# Patient Record
Sex: MALE | Race: WHITE | NOT HISPANIC OR LATINO | Employment: OTHER | URBAN - METROPOLITAN AREA
[De-identification: names, ages, dates, MRNs, and addresses within clinical notes are randomized per-mention and may not be internally consistent; named-entity substitution may affect disease eponyms.]

---

## 2017-01-23 ENCOUNTER — HOSPITAL ENCOUNTER (EMERGENCY)
Facility: HOSPITAL | Age: 72
Discharge: HOME | End: 2017-01-23
Payer: MEDICARE

## 2017-01-23 VITALS
HEIGHT: 72 IN | SYSTOLIC BLOOD PRESSURE: 199 MMHG | TEMPERATURE: 98 F | DIASTOLIC BLOOD PRESSURE: 84 MMHG | OXYGEN SATURATION: 95 % | BODY MASS INDEX: 28.17 KG/M2 | RESPIRATION RATE: 18 BRPM | WEIGHT: 208 LBS | HEART RATE: 72 BPM

## 2017-01-23 DIAGNOSIS — I10 ESSENTIAL HYPERTENSION: ICD-10-CM

## 2017-01-23 DIAGNOSIS — S09.90XA HEAD INJURY, INITIAL ENCOUNTER: ICD-10-CM

## 2017-01-23 DIAGNOSIS — S00.03XA SCALP HEMATOMA, INITIAL ENCOUNTER: ICD-10-CM

## 2017-01-23 DIAGNOSIS — S52.502A CLOSED FRACTURE OF DISTAL END OF LEFT RADIUS, UNSPECIFIED FRACTURE MORPHOLOGY, INITIAL ENCOUNTER: Primary | ICD-10-CM

## 2017-01-23 DIAGNOSIS — M25.432 WRIST SWELLING, LEFT: ICD-10-CM

## 2017-01-23 DIAGNOSIS — M79.602 LEFT ARM PAIN: ICD-10-CM

## 2017-01-23 PROCEDURE — 99284 EMERGENCY DEPT VISIT MOD MDM: CPT | Mod: 25

## 2017-01-23 PROCEDURE — 29125 APPL SHORT ARM SPLINT STATIC: CPT | Mod: LT

## 2017-01-23 PROCEDURE — 25000003 PHARM REV CODE 250: Performed by: PHYSICIAN ASSISTANT

## 2017-01-23 RX ADMIN — BACITRACIN, NEOMYCIN, POLYMYXIN B 1 EACH: 400; 3.5; 5 OINTMENT TOPICAL at 07:01

## 2017-01-23 NOTE — ED PROVIDER NOTES
Encounter Date: 1/23/2017       History     Chief Complaint   Patient presents with    Fall     tripped on foot and hit head at dialysis. fell saturday as well. pt not on blood thinners     Arm Injury     left. from fall saturday     Review of patient's allergies indicates:  No Known Allergies  HPI Comments: Pt reports to ED c/o arm injury after a fall.  Pt reports falling Saturday and hitting his left arm. Pt also fell today at dialysis after tripping on his shoe and hit the back of his head.  Incident occurred PTA. Pt denies any LOC, but wife reports two episodes of emesis en route to ED. Wife said the vomiting may be d/t the fact that he has not eaten all day.  Pain to left arm is mostly near left wrist with increased pain on palpation. Pt denies any current nausea, LOC, CP, SOB, dizziness, blurred vision, headache, or any other associated sxs.  Pt is currently off all blood thinners d/t hx of bleeding at fistula site, which is to left arm.  Pt reports pain is tolerable and prefers taking motrin for pain.     The history is provided by the patient.     Past Medical History   Diagnosis Date    Diabetes mellitus     Hypertension     Kidney failure     Prostate cancer      No past medical history pertinent negatives.  Past Surgical History   Procedure Laterality Date    Av fistula placement      Prostatectomy       History reviewed. No pertinent family history.  Social History   Substance Use Topics    Smoking status: Former Smoker    Smokeless tobacco: None    Alcohol use No     Review of Systems   Constitutional: Negative for fever.   HENT: Negative for sore throat.    Respiratory: Negative for shortness of breath.    Cardiovascular: Negative for chest pain.   Gastrointestinal: Negative for nausea.   Genitourinary: Negative for dysuria.   Musculoskeletal: Positive for arthralgias (left wrist pain with swelling). Negative for back pain and gait problem.   Skin: Positive for wound (laceration to back of  head). Negative for rash.   Neurological: Negative for weakness.   Hematological: Does not bruise/bleed easily.   All other systems reviewed and are negative.      Physical Exam   Initial Vitals   BP Pulse Resp Temp SpO2   17 1731 17 1731 17 1731 17 1731 17 1731   203/93 74 20 97.8 °F (36.6 °C) 95 %     Physical Exam    Nursing note and vitals reviewed.  Constitutional: He appears well-developed and well-nourished.   HENT:   Head: Normocephalic and atraumatic.   Right Ear: External ear normal.   Left Ear: External ear normal.   Mouth/Throat: Oropharynx is clear and moist.   Eyes: Conjunctivae and EOM are normal. Pupils are equal, round, and reactive to light.   Neck: Normal range of motion. Neck supple.   Cardiovascular: Normal rate and regular rhythm.   Pulmonary/Chest: Breath sounds normal. No respiratory distress. He has no wheezes.   Abdominal: Soft. Bowel sounds are normal. He exhibits no distension. There is no tenderness.   Musculoskeletal: Normal range of motion.   3/5 strength to left upper extremity with tenderness to palpation. Swelling to distal forearm and wrist.    Neurological: He is alert and oriented to person, place, and time. He has normal strength and normal reflexes.   Skin:   Superficial star burst laceration to posterior scalp with large hematoma measuring 3 cm in diameter. Not requiring sutures or staples.          ED Course   Orthopedic Injury  Date/Time: 2017 7:46 PM  Authorized by: FRANCINE JIMENEZ   Performed by: DINORAH JIMENEZ  Consent Done: Yes  Consent: Verbal consent obtained.  Risks and benefits: risks, benefits and alternatives were discussed  Consent given by: patient  Patient identity confirmed: , MRN and name  Injury location: wrist  Location details: left wrist  Injury type: fracture  Fracture type: distal radius  Pre-procedure distal perfusion: normal  Pre-procedure neurological function: normal  Pre-procedure neurovascular  assessment: neurovascularly intact  Pre-procedure range of motion: reduced  Local anesthesia used: no    Anesthesia:  Local anesthesia used: no  Sedation:  Patient sedated: no    Manipulation performed: no  Immobilization: splint  Splint type: sugar tong  Complications: No  Specimens: No  Implants: No  Post-procedure neurovascular assessment: post-procedure neurovascularly intact  Post-procedure distal perfusion: normal  Post-procedure neurological function: normal  Patient tolerance: Patient tolerated the procedure well with no immediate complications    Lac Repair  Date/Time: 1/23/2017 7:47 PM  Performed by: DINORAH JIMENEZ  Authorized by: FRANCINE JIMENEZ   Consent Done: Not Needed  Body area: head/neck  Location details: scalp  Foreign bodies: no foreign bodies  Tendon involvement: none  Nerve involvement: none  Patient sedated: no  Dressing: dressing applied, non-stick sterile dressing and petrolatum gauze  Patient tolerance: Patient tolerated the procedure well with no immediate complications        Labs Reviewed - No data to display                            ED Course     Clinical Impression:   The primary encounter diagnosis was Closed fracture of distal end of left radius, unspecified fracture morphology, initial encounter. Diagnoses of Wrist swelling, left, Left arm pain, Scalp hematoma, initial encounter, Head injury, initial encounter, and Essential hypertension were also pertinent to this visit.          SANDRA Herrera  01/23/17 1948

## 2017-01-23 NOTE — ED AVS SNAPSHOT
[facility]  [address] [address]               Jose Walton   2017  5:32 PM   ED    Description:  Male : 1945   Department:  [facility]           Your Care was Coordinated By:     Provider Role From To    SANDRA Herrera Physician Assistant [phone] --      Reason for Visit     Fall     Arm Injury           Diagnoses this Visit        Comments    Closed fracture of distal end of left radius, unspecified fracture morphology, initial encounter    -  Primary     Wrist swelling, left         Left arm pain         Scalp hematoma, initial encounter         Head injury, initial encounter         Essential hypertension           ED Disposition     None           To Do List           Follow-up Information     Follow up with Harrison Zarate MD In 2 days.    Specialty:  Orthopedic Surgery    Why:  for evaluation of fracture, If symptoms worsen return to ED     Contact information:    [id number] [place] [address]  [phone]        [facility]     [facility] Care [facility] -  Assistance  Registered nurses in the [facility] provide clinical advisement, health education, appointment booking, and other advisory services.  Call for this free service at [phone].             Medications           Message regarding Medications     Verify the changes and/or additions to your medication regime listed below are the same as discussed with your clinician today.  If any of these changes or additions are incorrect, please notify your healthcare provider.             Verify that the below list of medications is an accurate representation of the medications you are currently taking.  If none reported, the list may be blank. If incorrect, please contact your healthcare provider. Carry this list with you in case of emergency.                Clinical Reference Information           Your Vitals  Were     BP Pulse Temp Resp Height Weight    203/93 (BP Location: Right arm, Patient Position: Sitting) 74 97.8 °F (36.6 °C) (Oral) 20 6' (1.829 m) 94.3 kg (208 lb)    SpO2 BMI             95% 28.21 kg/m2         Allergies as of 1/23/2017     No Known Allergies      Immunizations Administered on Date of Encounter - 1/23/2017     None      ED Micro, Lab, POCT     None      ED Imaging Orders     Start Ordered       Status Ordering Provider    01/23/17 1800 01/23/17 1759  X-Ray Elbow Complete Left  1 time imaging      Final result     01/23/17 1800 01/23/17 1759  CT Head Without Contrast  1 time imaging      Final result     01/23/17 1759 01/23/17 1759  X-Ray Wrist Complete Left  1 time imaging      Final result         Discharge Instructions         Understanding a Distal Radius Fracture      A fracture is a broken bone. A fracture in the distal radius is a break in the lower end of the radius. This is the larger bone in the forearm. Because the break occurs near the wrist, it is often called a wrist fracture.    The bone may be cracked, or it may be broken into 2 or more pieces. The pieces of bone may be lined up or they may have moved out of place. Sometimes, the bone may break through the skin. Nearby nerves, tissues, and joints also may be damaged. Depending on the severity of the fracture, healing may take several months or longer.  What causes a distal radius fracture?  This type of fracture is most often caused from a fall on an outstretched hand. It can also be caused from a blow, accident, or sports injury.  Symptoms of a distal radius fracture  Symptoms can include pain, swelling, and bruising. If the bone breaks through the skin, external bleeding can also occur. The wrist may look crooked, deformed, or bent. It may be hard to move or use the arm, wrist, and hand for normal tasks and activities.  Treating a distal radius fracture  Treatment depends on how serious the fracture is. If needed, the bone is put  back into place. This may be done with or without surgery. If surgery is needed, the surgeon may use devices such as pins, plates, or screws to hold the bone together. You may need to wear a splint or cast for a month or longer to protect the bone and keep it in place during healing. Other treatments may be also used to help reduce symptoms or regain function. These include:  · Cold packs. Putting an ice pack on the injured area may help reduce swelling and pain.  · Raising the arm and wrist. Keeping the arm and wrist raised above heart level may help reduce swelling.  · Pain medicines. Taking prescription or over-the-counter pain medicines may help reduce pain and swelling.  · Exercises. Doing certain exercises at home or with a physical therapist can help restore strength, flexibility, and range of motion in your arm, wrist, and hand. In general, exercises are not started until after the splint or cast is removed.  Possible complications of a distal radius fracture  These can include:  · Poor healing of the bone  · Weakness, stiffness, or loss of range of motion in the arm, wrist, or hand  · Osteoarthritis in the wrist joint  When to call your healthcare provider  Call your healthcare provider right away if you have any of these:  · Fever of 100.4°F (38°C) or higher, or as directed  · Symptoms that dont get better with treatment, or get worse  · Numbness, coldness, or swelling in your arm, hand, or fingers  · Fingernails that turn blue or gray in color  · A splint or cast that is damaged or feels too tight or loose  · New symptoms   © 0915-5372 [employer]. [address] [place] [address], [place] [address]. All rights reserved. This information is not intended as a substitute for professional medical care. Always follow your healthcare professional's instructions.          [facility] Sign-Up     Activating your [facility] account is as easy as 1-2-3!     1) Visit [email or web address], select Sign Up Now, enter this  activation code and your date of birth, then select Next.  [email or web address]  Expires: 3/9/2017  7:44 PM      2) Create a username and password to use when you visit [facility] in the future and select a security question in case you lose your password and select Next.    3) Enter your e-mail address and click Sign Up!    Additional Information  If you have questions, please e-mail FAD ? [email or web address] or call [phone] to talk to our [facility] staff. Remember, [facility] is NOT to be used for urgent needs. For medical emergencies, dial 911.         Smoking Cessation     If you would like to quit smoking:   You may be eligible for free services if you are a [place] resident and started smoking cigarettes before September 1, 1988.  Call the Smoking Cessation Trust (SCT) toll free at (394) 111-8551 or (747) 395-5763.   Call 6-453-QUIT-NOW if you do not meet the above criteria.             [facility] complies with applicable Federal civil rights laws and does not discriminate on the basis of race, color, national origin, age, disability, or sex.        Language Assistance Services     ATTENTION: Language assistance services are available, free of charge. Please call [phone].      ATENCIÓN: Si habla español, tiene a ramirez disposición servicios gratuitos de asistencia lingüística. Llame al [phone].     CHÚ Ý: N?u b?n nói Ti?ng Vi?t, có các d?ch v? h? tr? ngôn ng? mi?n phí dành cho b?n. G?i s? [phone].

## 2017-01-24 NOTE — ED NOTES
Report rec'd from Tameka.     Pt AAOx3. Abrasion noted to posterior head. Bleeding controlled. Respirations even and unlabored. Pupils PERRLA. Left arm pain s/p fall. Swelling noted to left arm. Cap refill <3sec bilateral upper extremities, 2+ radial pulses to bilateral upper extremities. Pt sitting in wheelchair with spouse at bedside. Answered all questions.

## 2017-01-24 NOTE — ED NOTES
"Romain notified of pt discharge vital signs. OK for pt to be discharged at this time. Pt states " I have another blood pressure medicine to take once I get home".   "

## 2017-01-24 NOTE — DISCHARGE INSTRUCTIONS
Understanding a Distal Radius Fracture      A fracture is a broken bone. A fracture in the distal radius is a break in the lower end of the radius. This is the larger bone in the forearm. Because the break occurs near the wrist, it is often called a wrist fracture.    The bone may be cracked, or it may be broken into 2 or more pieces. The pieces of bone may be lined up or they may have moved out of place. Sometimes, the bone may break through the skin. Nearby nerves, tissues, and joints also may be damaged. Depending on the severity of the fracture, healing may take several months or longer.  What causes a distal radius fracture?  This type of fracture is most often caused from a fall on an outstretched hand. It can also be caused from a blow, accident, or sports injury.  Symptoms of a distal radius fracture  Symptoms can include pain, swelling, and bruising. If the bone breaks through the skin, external bleeding can also occur. The wrist may look crooked, deformed, or bent. It may be hard to move or use the arm, wrist, and hand for normal tasks and activities.  Treating a distal radius fracture  Treatment depends on how serious the fracture is. If needed, the bone is put back into place. This may be done with or without surgery. If surgery is needed, the surgeon may use devices such as pins, plates, or screws to hold the bone together. You may need to wear a splint or cast for a month or longer to protect the bone and keep it in place during healing. Other treatments may be also used to help reduce symptoms or regain function. These include:  · Cold packs. Putting an ice pack on the injured area may help reduce swelling and pain.  · Raising the arm and wrist. Keeping the arm and wrist raised above heart level may help reduce swelling.  · Pain medicines. Taking prescription or over-the-counter pain medicines may help reduce pain and swelling.  · Exercises. Doing certain exercises at home or with a physical  therapist can help restore strength, flexibility, and range of motion in your arm, wrist, and hand. In general, exercises are not started until after the splint or cast is removed.  Possible complications of a distal radius fracture  These can include:  · Poor healing of the bone  · Weakness, stiffness, or loss of range of motion in the arm, wrist, or hand  · Osteoarthritis in the wrist joint  When to call your healthcare provider  Call your healthcare provider right away if you have any of these:  · Fever of 100.4°F (38°C) or higher, or as directed  · Symptoms that dont get better with treatment, or get worse  · Numbness, coldness, or swelling in your arm, hand, or fingers  · Fingernails that turn blue or gray in color  · A splint or cast that is damaged or feels too tight or loose  · New symptoms   © 3135-3671 [employer]. [address] [place] [address]. All rights reserved. This information is not intended as a substitute for professional medical care. Always follow your healthcare professional's instructions.

## 2017-01-31 ENCOUNTER — HOSPITAL ENCOUNTER (OUTPATIENT)
Dept: RADIOLOGY | Facility: HOSPITAL | Age: 72
Discharge: HOME | End: 2017-01-31
Attending: ORTHOPAEDIC SURGERY
Payer: MEDICARE

## 2017-01-31 ENCOUNTER — OFFICE VISIT (OUTPATIENT)
Dept: ORTHOPEDICS | Facility: CLINIC | Age: 72
End: 2017-01-31
Payer: MEDICARE

## 2017-01-31 VITALS
SYSTOLIC BLOOD PRESSURE: 159 MMHG | WEIGHT: 176.81 LBS | HEIGHT: 72 IN | HEART RATE: 66 BPM | DIASTOLIC BLOOD PRESSURE: 60 MMHG | BODY MASS INDEX: 23.95 KG/M2

## 2017-01-31 DIAGNOSIS — M79.602 LEFT ARM PAIN: Primary | ICD-10-CM

## 2017-01-31 DIAGNOSIS — S62.102D LEFT WRIST FRACTURE, WITH ROUTINE HEALING, SUBSEQUENT ENCOUNTER: Primary | ICD-10-CM

## 2017-01-31 DIAGNOSIS — M79.602 LEFT ARM PAIN: ICD-10-CM

## 2017-01-31 DIAGNOSIS — S52.532A CLOSED COLLES' FRACTURE OF LEFT RADIUS, INITIAL ENCOUNTER: Primary | ICD-10-CM

## 2017-01-31 DIAGNOSIS — E11.22 TYPE 2 DIABETES MELLITUS WITH DIABETIC CHRONIC KIDNEY DISEASE, UNSPECIFIED CKD STAGE, UNSPECIFIED LONG TERM INSULIN USE STATUS: ICD-10-CM

## 2017-01-31 DIAGNOSIS — N18.6 END STAGE RENAL DISEASE: ICD-10-CM

## 2017-01-31 PROCEDURE — 73100 X-RAY EXAM OF WRIST: CPT | Mod: 26,LT,, | Performed by: RADIOLOGY

## 2017-01-31 PROCEDURE — 73100 X-RAY EXAM OF WRIST: CPT | Mod: TC,PO,LT

## 2017-01-31 PROCEDURE — 73070 X-RAY EXAM OF ELBOW: CPT | Mod: TC,PO,LT

## 2017-01-31 PROCEDURE — 99999 PR PBB SHADOW E&M-EST. PATIENT-LVL III: CPT | Mod: PBBFAC,,, | Performed by: PHYSICIAN ASSISTANT

## 2017-01-31 PROCEDURE — 29075 APPL CST ELBW FNGR SHORT ARM: CPT | Mod: S$PBB,LT,, | Performed by: PHYSICIAN ASSISTANT

## 2017-01-31 PROCEDURE — 73070 X-RAY EXAM OF ELBOW: CPT | Mod: 26,LT,, | Performed by: RADIOLOGY

## 2017-01-31 PROCEDURE — 99204 OFFICE O/P NEW MOD 45 MIN: CPT | Mod: 25,S$PBB,, | Performed by: PHYSICIAN ASSISTANT

## 2017-01-31 RX ORDER — CLOPIDOGREL BISULFATE 75 MG/1
TABLET ORAL
COMMUNITY
Start: 2017-01-27 | End: 2017-01-31

## 2017-01-31 RX ORDER — LIDOCAINE AND PRILOCAINE 25; 25 MG/G; MG/G
CREAM TOPICAL
Refills: 99 | COMMUNITY
Start: 2017-01-20

## 2017-01-31 RX ORDER — NEBIVOLOL HYDROCHLORIDE 10 MG/1
10 TABLET ORAL 2 TIMES DAILY
Refills: 3 | COMMUNITY
Start: 2016-12-29

## 2017-01-31 RX ORDER — HYDRALAZINE HYDROCHLORIDE AND ISOSORBIDE DINITRATE 37.5; 2 MG/1; MG/1
TABLET, FILM COATED ORAL
COMMUNITY
Start: 2017-01-22 | End: 2017-05-08 | Stop reason: CLARIF

## 2017-01-31 RX ORDER — LISINOPRIL 20 MG/1
20 TABLET ORAL DAILY
COMMUNITY
Start: 2017-01-22

## 2017-01-31 RX ORDER — ISOSORBIDE DINITRATE 20 MG/1
20 TABLET ORAL 3 TIMES DAILY
COMMUNITY
Start: 2017-01-18

## 2017-01-31 RX ORDER — DIPHENOXYLATE HYDROCHLORIDE AND ATROPINE SULFATE 2.5; .025 MG/1; MG/1
TABLET ORAL
Refills: 2 | COMMUNITY
Start: 2016-12-15

## 2017-01-31 RX ORDER — VENLAFAXINE HYDROCHLORIDE 75 MG/1
75 CAPSULE, EXTENDED RELEASE ORAL DAILY
COMMUNITY
Start: 2017-01-27

## 2017-01-31 NOTE — PROGRESS NOTES
Subjective:      Patient ID: Jose Walton is a 71 y.o. male.    Chief Complaint: Pain of the Left Wrist      HPI: Jose Walton  is a 71 y.o. male who c/o Pain of the Left Wrist   for duration of 8 days.  He fell backwards and reached behind him.  He said this happened while he was coming out of the storage unit.  He was seen at [facility] and diagnosed with a left distal radius fracture and put him in a short-arm splint.  He was told to follow-up with orthopedics for further evaluation and treatment.  Pain level is 0 out of 10 in severity.  Quality is aching and intermittent.  Alleviating factors include immobilization.  Aggravating factors include trying to use the left upper extremity.  Of note, he has chronic renal disease and is on dialysis 3 times weekly.  He is diabetic.    Review of Systems   Constitution: Negative for fever.   HENT: Negative for headaches.    Cardiovascular: Negative for chest pain.   Respiratory: Negative for cough and shortness of breath.    Skin: Negative for rash.   Musculoskeletal: Positive for joint pain, joint swelling and stiffness.   Gastrointestinal: Negative for heartburn.   Neurological: Negative for numbness.         Objective:        General    Nursing note and vitals reviewed.  Constitutional: He is oriented to person, place, and time. He appears well-developed and well-nourished.   HENT:   Head: Normocephalic and atraumatic.   Eyes: EOM are normal.   Cardiovascular: Normal rate and regular rhythm.    Pulmonary/Chest: Effort normal.   Abdominal: Soft.   Neurological: He is alert and oriented to person, place, and time.   Psychiatric: He has a normal mood and affect. His behavior is normal.         Left Hand/Wrist Exam     Inspection   Scars: Wrist - absent Hand -  absent  Effusion: Wrist - absent Hand -  absent  Bruising: Wrist - present (volarly) Hand -  absent  Deformity: Wrist - absent Hand -  absent    Tenderness   The patient is tender to  palpation of the radial area.     Comments:  He has a 2+ radial pulse.  Compartments are soft.  He has sensation intact to light touch to the fingertips.  Pain-free passive range of motion of the fingers.  He is able to make a fist.  He has decreased range of motion as well as strength of the left wrist secondary to recent injury.                  Xray:   Left elbow from today images and report were reviewed today.  I agree with the radiologist's interpretation.  Joint spaces are preserved.  There is a small posterior olecranon spur and mild overlying soft tissue swelling.  No joint effusion.  No acute fracture or dislocation.  Surgical clips in the antecubital fossa.    Left wrist from today images and report were reviewed today.  I agree with the radiologist's interpretation.  There is redemonstration of a mildly impacted and dorsally displaced fracture of the distal radial metaphysis.  Position and alignment of fracture fragments is stable.    Assessment:       Encounter Diagnoses   Name Primary?    Closed Colles' fracture of left radius, initial encounter Yes    End stage renal disease     Type 2 diabetes mellitus with diabetic chronic kidney disease, unspecified CKD stage, unspecified long term insulin use status           Plan:       Jose was seen today for pain.    Diagnoses and all orders for this visit:    Closed Colles' fracture of left radius, initial encounter    End stage renal disease    Type 2 diabetes mellitus with diabetic chronic kidney disease, unspecified CKD stage, unspecified long term insulin use status      Mr. Gonzalez comes in today for evaluation of a new problem of left distal radius.  He does have a distal radius fracture with slight dorsal angulation.  I have discussed this patient as well as his x-rays with Dr. Carl.  Dr. Carl actually spoke with the patient today as well.  His options include conservative management with cast immobilization.  He also has the option of doing an  open reduction internal fixation of the left distal radius under regional block.  Conservative treatment with cast immobilization does have a slightly higher risk of developing intra-articular arthritis of the left wrist.  However, functionally, I would not expect him to have any problems.  Surgical fixation does certainly pose further risks including wound complications, nonunion, hardware failure as well as perioperative complications.  This is risk is elevated in him given his medical co-morbidities.  Dr. Carl explained to the patient he feels surgery would not improve his outcome substantially.  At this time, the patient wishes to proceed with her conservative management with cast immobilization.  Ideally, I would put him into a long-arm cast to give him rotational control.  Unfortunately, the left arm is his sport for the dialysis.  As such, Dr. Carl agrees to put him into a short arm cast just below the elbow.  We have given the patient instructions to avoid excessive pronation and supination.  We will follow him closely with serial x-rays on a weekly basis for the next 2 weeks.  If he has any problems before then he should notify the office.  Patient and wife verbalized understanding and agree to the above plan.    Return in about 1 week (around 2/7/2017) for fu with xray in cast.          The patient understands, chooses and consents to this plan and accepts all   the risks which include but are not limited to the risks mentioned above.     Application short arm cast - left:  I placed the patient into well padded and well molded short arm cast for the left wrist - distal radius fracture. He tolerated the procedure(s) well. He was sent home in stable condition after going over cast care instructions; keeping it dry, not using it as a weapon, and not sticking anything down the cast(s) to scratch an itch. Patient experienced improved pain control after the casts were placed and was sent home in stable  condition.  He will follow up as instructed and notify the office of any problems prior to follow up.    Disclaimer: This note was prepared using a voice recognition system and is likely to have sound alike errors within the text.

## 2017-01-31 NOTE — MR AVS SNAPSHOT
[facility]  [address] [place] [address]  Phone: [phone]  Fax: [phone]                  Jose Walton   2017 10:30 AM   Office Visit    Description:  Male : 1945   Provider:  Yolande Acuna PA-C   Department:  [facility]           Reason for Visit     Left Wrist - Pain                To Do List           Future Appointments        Provider Department Dept Phone    2017 10:00 AM [facility] XR2 [facility] [phone]    2017 10:15 AM Yolande Acuna PA-C [facility] [phone]      Goals (5 Years of Data)     None      [facility]     [facility] Care [facility] -  Assistance  Registered nurses in the [facility] provide clinical advisement, health education, appointment booking, and other advisory services.  Call for this free service at [phone].             Medications           Message regarding Medications     Verify the changes and/or additions to your medication regime listed below are the same as discussed with your clinician today.  If any of these changes or additions are incorrect, please notify your healthcare provider.        STOP taking these medications     clopidogrel (PLAVIX) 75 mg tablet            Verify that the below list of medications is an accurate representation of the medications you are currently taking.  If none reported, the list may be blank. If incorrect, please contact your healthcare provider. Carry this list with you in case of emergency.           Current Medications     BIDIL 20-37.5 mg Tab     BYSTOLIC 10 mg Tab Take 10 mg by mouth once daily.    diphenoxylate-atropine 2.5-0.025 mg (LOMOTIL) 2.5-0.025 mg per tablet TAKE 1 TABLET BY MOUTH EVERY 8 HOURS AS NEEDED FOR LOOSE STOOL    isosorbide dinitrate (ISORDIL) 20 MG tablet     lidocaine-prilocaine (EMLA) cream APPLY SMALL AMOUNT TO ACCESS SITE 1 HOUR BEFORE DIALYSIS ; COVER WITH AN OCCLUSIVE  DRESSING    lisinopril (PRINIVIL,ZESTRIL) 20 MG tablet     venlafaxine (EFFEXOR-XR) 75 MG 24 hr capsule            Clinical Reference Information           Vital Signs - Last Recorded  Most recent update: 1/31/2017 10:24 AM by Luz Schmitt    BP Pulse Ht Wt BMI    (!) 159/60 (BP Location: Left arm, Patient Position: Sitting, BP Method: Automatic) 66 6' (1.829 m) 80.2 kg (176 lb 12.9 oz) 23.98 kg/m2      Blood Pressure          Most Recent Value    BP  (!)  159/60      Allergies as of 1/31/2017     No Known Allergies      Immunizations Administered on Date of Encounter - 1/31/2017     None      [facility] Sign-Up     Activating your [facility] account is as easy as 1-2-3!     1) Visit [email or web address], select Sign Up Now, enter this activation code and your date of birth, then select Next.  [email or web address]  Expires: 3/9/2017  7:44 PM      2) Create a username and password to use when you visit [facility] in the future and select a security question in case you lose your password and select Next.    3) Enter your e-mail address and click Sign Up!    Additional Information  If you have questions, please e-mail [email or web address] or call [phone] to talk to our [facility] staff. Remember, [facility] is NOT to be used for urgent needs. For medical emergencies, dial 911.

## 2017-01-31 NOTE — LETTER
January 31, 2017      SANDRA Herrera  [id number] [place] [address] [address]  [facility]  [place] [address]           [facility]  [address]  [place] [address]  Phone: [phone]  Fax: [phone]          Patient: Jose Walton   MR Number: [id number]   YOB: 1945   Date of Visit: 1/31/2017       Dear Zia Candelario:    Thank you for referring Jose Walton to me for evaluation. Attached you will find relevant portions of my assessment and plan of care.    If you have questions, please do not hesitate to call me. I look forward to following Jose Walton along with you.    Sincerely,    Yolande Acuna PA-C    Enclosure  CC:  No Recipients    If you would like to receive this communication electronically, please contact [email or web address] or [phone] to request more information on [employer] [facility] access.    For providers and/or their staff who would like to refer a patient to [facility], please contact us through our one-stop-shop provider referral line, [facility] , at [phone].    If you feel you have received this communication in error or would no longer like to receive these types of communications, please e-mail [email or web address]

## 2017-02-07 ENCOUNTER — HOSPITAL ENCOUNTER (OUTPATIENT)
Dept: RADIOLOGY | Facility: HOSPITAL | Age: 72
Discharge: HOME | End: 2017-02-07
Attending: ORTHOPAEDIC SURGERY
Payer: MEDICARE

## 2017-02-07 ENCOUNTER — TELEPHONE (OUTPATIENT)
Dept: ORTHOPEDICS | Facility: CLINIC | Age: 72
End: 2017-02-07

## 2017-02-07 DIAGNOSIS — S62.102D LEFT WRIST FRACTURE, WITH ROUTINE HEALING, SUBSEQUENT ENCOUNTER: ICD-10-CM

## 2017-02-07 PROCEDURE — 73110 X-RAY EXAM OF WRIST: CPT | Mod: TC,PO,LT

## 2017-02-07 PROCEDURE — 73110 X-RAY EXAM OF WRIST: CPT | Mod: 26,LT,, | Performed by: RADIOLOGY

## 2017-02-07 NOTE — TELEPHONE ENCOUNTER
Spoke to pt wife informed her I was calling to see if pt was going to come to his appointment. Pt wife stated that he took the x-ray in [place] and the office there informed them that we will call them with the results. I informed pt wife I will give the message to the provider. Pt wife verbalized she understood.

## 2017-02-07 NOTE — TELEPHONE ENCOUNTER
----- Message from Luz Schmitt sent at 2/7/2017 10:26 AM CST -----  Pt wife stated that they were told by the [place] office you will call pt with the results due to the weather.       2/7/16 at 3:25pm  I called the patient and spoke to his wife.  Fracture appears stable.  She tells me the cast is still fitting him fine.  He is trying to avoid supination as much as possible.  He is not having any problems.  I will see him back in the office in 3 weeks.  At that time we will plan to remove the cast and get repeat x-rays.  I'll have my office contact them to see if that schedule.  Patient verbalizes understanding.

## 2017-02-08 DIAGNOSIS — S62.102D LEFT WRIST FRACTURE, WITH ROUTINE HEALING, SUBSEQUENT ENCOUNTER: Primary | ICD-10-CM

## 2017-03-02 ENCOUNTER — HOSPITAL ENCOUNTER (OUTPATIENT)
Dept: RADIOLOGY | Facility: HOSPITAL | Age: 72
Discharge: HOME | End: 2017-03-02
Attending: ORTHOPAEDIC SURGERY
Payer: MEDICARE

## 2017-03-02 ENCOUNTER — OFFICE VISIT (OUTPATIENT)
Dept: ORTHOPEDICS | Facility: CLINIC | Age: 72
End: 2017-03-02
Payer: MEDICARE

## 2017-03-02 VITALS
WEIGHT: 170.19 LBS | SYSTOLIC BLOOD PRESSURE: 162 MMHG | DIASTOLIC BLOOD PRESSURE: 63 MMHG | HEIGHT: 72 IN | BODY MASS INDEX: 23.05 KG/M2 | HEART RATE: 70 BPM

## 2017-03-02 DIAGNOSIS — M25.532 LEFT WRIST PAIN: Primary | ICD-10-CM

## 2017-03-02 DIAGNOSIS — S52.532D CLOSED COLLES' FRACTURE OF LEFT RADIUS WITH ROUTINE HEALING, SUBSEQUENT ENCOUNTER: Primary | ICD-10-CM

## 2017-03-02 DIAGNOSIS — S62.102D LEFT WRIST FRACTURE, WITH ROUTINE HEALING, SUBSEQUENT ENCOUNTER: ICD-10-CM

## 2017-03-02 PROCEDURE — 99213 OFFICE O/P EST LOW 20 MIN: CPT | Mod: PBBFAC,PO | Performed by: PHYSICIAN ASSISTANT

## 2017-03-02 PROCEDURE — 99999 PR PBB SHADOW E&M-EST. PATIENT-LVL III: CPT | Mod: PBBFAC,,, | Performed by: PHYSICIAN ASSISTANT

## 2017-03-02 PROCEDURE — 73110 X-RAY EXAM OF WRIST: CPT | Mod: 26,LT,, | Performed by: RADIOLOGY

## 2017-03-02 PROCEDURE — 99213 OFFICE O/P EST LOW 20 MIN: CPT | Mod: S$PBB,,, | Performed by: PHYSICIAN ASSISTANT

## 2017-03-02 RX ORDER — HYDRALAZINE HYDROCHLORIDE 50 MG/1
50 TABLET, FILM COATED ORAL 3 TIMES DAILY
COMMUNITY
Start: 2017-02-24

## 2017-03-02 NOTE — PROGRESS NOTES
Patient ID: Jose Walton is a 71 y.o. male.    Chief Complaint: Pain of the Left Wrist      HPI: Jose Walton  is a 71 y.o. male who c/o Pain of the Left Wrist   for duration of just over 5 weeks.  I have been treating him conservatively for left distal radius fracture.  I saw the patient January 31, 2017.  Time, Dr. Carl also talked to the patient regarding surgical versus nonsurgical intervention.  The patient chose to go with conservative treatment in a cast.  His pain level today is 0 out of 10 in severity.  Quality is none.  Alleviating factors included cast immobilization.  Aggravating factors nothing at this time.  He denies numbness and tingling.        Objective:        General    Nursing note and vitals reviewed.  Constitutional: He is oriented to person, place, and time. He appears well-developed and well-nourished.   HENT:   Head: Normocephalic and atraumatic.   Eyes: EOM are normal.   Cardiovascular: Normal rate and regular rhythm.    Pulmonary/Chest: Effort normal.   Abdominal: Soft.   Neurological: He is alert and oriented to person, place, and time.   Psychiatric: He has a normal mood and affect. His behavior is normal.         Left Elbow Exam     Comments:  He has motor function and sensation intact in the left upper extremity.  He has some pain-free active flexion and extension of the left wrist.  He has near full supination.  He has full pronation.  He does have decreased  strength on the left side in comparison to the right.  He has a 2+ radial pulse.  Capillary refill less than 2 seconds.  Swelling is resolved although he does still have slight ecchymosis on the volar aspect of the wrist.  He has no tenderness to palpation over the distal radius today.                Xray:   Left wrist from today images and report were reviewed today.  I agree with the radiologist's interpretation.  3 views. Comparison January 31, 2017. Previously noted findings are stable including mildly  comminuted horizontal fracture distal left radius, clips in the soft tissues and arteriosclerotic disease. No new significant findings.  Interval callus formation is noted.    Assessment:       Encounter Diagnosis   Name Primary?    Closed Colles' fracture of left radius with routine healing, subsequent encounter Yes          Plan:       Jose was seen today for pain.    Diagnoses and all orders for this visit:    Closed Colles' fracture of left radius with routine healing, subsequent encounter    Mr. Walton comes in today for repeat evaluation of a closed distal radius fracture.  He has been in a cast for the last 4 weeks, and was in a splint for a week prior to that.  He is improving.  He has minimal pain today.  He has intact range of motion which is pain-free.  His time, I do recommend he go into a cockup wrist splint.  He should wear that over the next 4 weeks and I will reevaluate him in 1 month.  He can remove the splint 2 times a day to work some gentle range of motion exercises for the left wrist.  He should avoid any heavy lifting with the left upper extremity.  He should continue to observe fall precautions and use his walker for ambulation.  Stands the fracture is not fully healed and that if he were to all he could reinjure the wrist.  Patient verbalizes understanding and agrees with the above.  Return in about 4 weeks (around 3/30/2017).        The patient understands, chooses and consents to this plan and accepts all   the risks which include but are not limited to the risks mentioned above.     Disclaimer: This note was prepared using a voice recognition system and is likely to have sound alike errors within the text.

## 2017-03-02 NOTE — MR AVS SNAPSHOT
[facility]  [address] [place] [address]  Phone: [phone]  Fax: [phone]                  Jose Walton   3/2/2017 10:15 AM   Office Visit    Description:  Male : 1945   Provider:  Yolande Acuna PA-C   Department:  [facility]           Reason for Visit     Left Wrist - Pain           Diagnoses this Visit        Comments    Closed Colles' fracture of left radius with routine healing, subsequent encounter    -  Primary            To Do List           Future Appointments        Provider Department Dept Phone    3/30/2017 10:30 AM [facility] XR2 [facility] [phone]    3/30/2017 10:45 AM Yolande Acuna PA-C [facility] [phone]      Goals (5 Years of Data)     None      Follow-Up and Disposition     Return in about 4 weeks (around 3/30/2017).      [facility]     [facility] Care [facility] -  Assistance  Registered nurses in the [facility] provide clinical advisement, health education, appointment booking, and other advisory services.  Call for this free service at [phone].             Medications           Message regarding Medications     Verify the changes and/or additions to your medication regime listed below are the same as discussed with your clinician today.  If any of these changes or additions are incorrect, please notify your healthcare provider.             Verify that the below list of medications is an accurate representation of the medications you are currently taking.  If none reported, the list may be blank. If incorrect, please contact your healthcare provider. Carry this list with you in case of emergency.           Current Medications     BIDIL 20-37.5 mg Tab     BYSTOLIC 10 mg Tab Take 10 mg by mouth once daily.    diphenoxylate-atropine 2.5-0.025 mg (LOMOTIL) 2.5-0.025 mg per tablet TAKE 1 TABLET BY MOUTH EVERY 8 HOURS AS NEEDED FOR LOOSE STOOL    hydrALAZINE  (APRESOLINE) 50 MG tablet     isosorbide dinitrate (ISORDIL) 20 MG tablet     lidocaine-prilocaine (EMLA) cream APPLY SMALL AMOUNT TO ACCESS SITE 1 HOUR BEFORE DIALYSIS ; COVER WITH AN OCCLUSIVE DRESSING    lisinopril (PRINIVIL,ZESTRIL) 20 MG tablet     venlafaxine (EFFEXOR-XR) 75 MG 24 hr capsule            Clinical Reference Information           Your Vitals Were     BP                   162/63 (BP Location: Right arm, Patient Position: Sitting, BP Method: Automatic)           Blood Pressure          Most Recent Value    BP  (!)  162/63      Allergies as of 3/2/2017     No Known Allergies      Immunizations Administered on Date of Encounter - 3/2/2017     None      [facility] Sign-Up     Activating your [facility] account is as easy as 1-2-3!     1) Visit [email or web address], select Sign Up Now, enter this activation code and your date of birth, then select Next.  [email or web address]  Expires: 3/9/2017  7:44 PM      2) Create a username and password to use when you visit [facility] in the future and select a security question in case you lose your password and select Next.    3) Enter your e-mail address and click Sign Up!    Additional Information  If you have questions, please e-mail [email or web address] or call [phone] to talk to our [facility] staff. Remember, [facility] is NOT to be used for urgent needs. For medical emergencies, dial 911.         Language Assistance Services     ATTENTION: Language assistance services are available, free of charge. Please call [phone].      ATENCIÓN: Si adalila cris, tiene a ramirez disposición servicios gratuitos de asistencia lingüística. Llame al [phone].     CHÚ Ý: N?u b?n nói Ti?ng Vi?t, có các d?ch v? h? tr? ngôn ng? mi?n phí dành cho b?n. G?i s? [phone].         [facility] complies with applicable Federal civil rights laws and does not discriminate on the basis of race, color, national origin, age, disability, or sex.

## 2017-05-08 ENCOUNTER — HOSPITAL ENCOUNTER (EMERGENCY)
Facility: HOSPITAL | Age: 72
Discharge: HOME | End: 2017-05-08
Attending: EMERGENCY MEDICINE
Payer: MEDICARE

## 2017-05-08 VITALS
OXYGEN SATURATION: 100 % | HEART RATE: 65 BPM | TEMPERATURE: 98 F | WEIGHT: 175 LBS | DIASTOLIC BLOOD PRESSURE: 65 MMHG | BODY MASS INDEX: 23.73 KG/M2 | SYSTOLIC BLOOD PRESSURE: 147 MMHG | RESPIRATION RATE: 19 BRPM

## 2017-05-08 DIAGNOSIS — T82.591A AV SHUNT MALFUNCTION, INITIAL ENCOUNTER: ICD-10-CM

## 2017-05-08 DIAGNOSIS — T82.838A BLEEDING FROM DIALYSIS SHUNT, INITIAL ENCOUNTER: Primary | ICD-10-CM

## 2017-05-08 LAB
ALBUMIN SERPL BCP-MCNC: 3.4 G/DL
ALP SERPL-CCNC: 185 U/L
ALT SERPL W/O P-5'-P-CCNC: 18 U/L
ANION GAP SERPL CALC-SCNC: 14 MMOL/L
APTT BLDCRRT: 30.7 SEC
AST SERPL-CCNC: 16 U/L
BASOPHILS # BLD AUTO: 0.06 K/UL
BASOPHILS NFR BLD: 0.6 %
BILIRUB SERPL-MCNC: 0.8 MG/DL
BUN SERPL-MCNC: 16 MG/DL
CALCIUM SERPL-MCNC: 9.3 MG/DL
CHLORIDE SERPL-SCNC: 100 MMOL/L
CO2 SERPL-SCNC: 25 MMOL/L
CREAT SERPL-MCNC: 3.4 MG/DL
DIFFERENTIAL METHOD: ABNORMAL
EOSINOPHIL # BLD AUTO: 0.2 K/UL
EOSINOPHIL NFR BLD: 2.1 %
ERYTHROCYTE [DISTWIDTH] IN BLOOD BY AUTOMATED COUNT: 18 %
EST. GFR  (AFRICAN AMERICAN): 19.8 ML/MIN/1.73 M^2
EST. GFR  (NON AFRICAN AMERICAN): 17.2 ML/MIN/1.73 M^2
GLUCOSE SERPL-MCNC: 110 MG/DL
HCT VFR BLD AUTO: 30 %
HGB BLD-MCNC: 9.3 G/DL
INR PPP: 1.1
LYMPHOCYTES # BLD AUTO: 1 K/UL
LYMPHOCYTES NFR BLD: 10 %
MCH RBC QN AUTO: 27.8 PG
MCHC RBC AUTO-ENTMCNC: 31 %
MCV RBC AUTO: 90 FL
MONOCYTES # BLD AUTO: 0.6 K/UL
MONOCYTES NFR BLD: 5.6 %
NEUTROPHILS # BLD AUTO: 8.3 K/UL
NEUTROPHILS NFR BLD: 81 %
PLATELET # BLD AUTO: 263 K/UL
PMV BLD AUTO: 9 FL
POTASSIUM SERPL-SCNC: 3.8 MMOL/L
PROT SERPL-MCNC: 7.1 G/DL
PROTHROMBIN TIME: 11.4 SEC
RBC # BLD AUTO: 3.35 M/UL
SODIUM SERPL-SCNC: 139 MMOL/L
WBC # BLD AUTO: 10.23 K/UL

## 2017-05-08 PROCEDURE — 25000003 PHARM REV CODE 250: Performed by: EMERGENCY MEDICINE

## 2017-05-08 PROCEDURE — 80053 COMPREHEN METABOLIC PANEL: CPT

## 2017-05-08 PROCEDURE — 85025 COMPLETE CBC W/AUTO DIFF WBC: CPT

## 2017-05-08 PROCEDURE — 12001 RPR S/N/AX/GEN/TRNK 2.5CM/<: CPT

## 2017-05-08 PROCEDURE — 99284 EMERGENCY DEPT VISIT MOD MDM: CPT | Mod: 25

## 2017-05-08 PROCEDURE — 85730 THROMBOPLASTIN TIME PARTIAL: CPT

## 2017-05-08 PROCEDURE — 85610 PROTHROMBIN TIME: CPT

## 2017-05-08 PROCEDURE — 27000221 HC OXYGEN, UP TO 24 HOURS

## 2017-05-08 RX ADMIN — BACITRACIN ZINC, NEOMYCIN SULFATE, POLYMYXIN B SULFATE 1 EACH: 3.5; 5000; 4 OINTMENT TOPICAL at 06:05

## 2017-05-08 RX ADMIN — LIDOCAINE HYDROCHLORIDE 1 ML: 10; .005 INJECTION, SOLUTION EPIDURAL; INFILTRATION; INTRACAUDAL; PERINEURAL at 06:05

## 2017-05-08 NOTE — ED PROVIDER NOTES
Encounter Date: 5/8/2017       History     Chief Complaint   Patient presents with    Shunt Problem     bleeding shunt to the LUE     Review of patient's allergies indicates:  No Known Allergies    Patient is a 71 y.o. male presenting with the following complaint: general illness. The history is provided by the patient and the EMS personnel.   General Illness    The current episode started just prior to arrival. The pain is at a severity of 0/10. Nothing relieves the symptoms. Pertinent negatives include no fever, no abdominal pain, no diarrhea, no nausea, no vomiting, no congestion, no headaches, no sore throat, no neck stiffness, no cough, no shortness of breath and no rash. Recent Medical Care: patient was at dialysis just prior to his arrival and developed complications of his AV shunt.   Mr. Walton presents to the emergency department via [facility] from dialysis with complaints of uncontrolled bleeding to his brachial-cephalic arteriovenous fistula.  [facility] reports that the patient had uncontrolled bleeding from his dialysis shunt for approximately 45 minutes.  Bleeding is controlled with direct pressure.  Mr. Walton received dialysis every Monday, Wednesday, and Friday.  The patient denies any complaints of weakness, dizziness, or pain.         PCP:                 Milton Gutiérrez MD  Nephrology:      Martín Tidwell MD  ([facility])  Cardiologyt:      Steve Lim MD  ([facility])  Urology:            Bradly Dao MD ([facility])  Vascular:          Rufus Leon MD  ([facility])        Past Medical History:   Diagnosis Date    CAD (coronary artery disease)     Depression     Diabetes mellitus     IDDM    Dialysis patient     History of MI (myocardial infarction)     Hypertension     Kidney failure     ESRD    Prostate cancer      Past Surgical History:   Procedure Laterality Date    AV FISTULA PLACEMENT       CARDIAC CATHETERIZATION      PROSTATECTOMY       History reviewed. No pertinent family history.  Social History   Substance Use Topics    Smoking status: Former Smoker    Smokeless tobacco: Never Used    Alcohol use No     Review of Systems   Constitutional: Negative for chills and fever.   HENT: Negative for congestion and sore throat.    Respiratory: Negative for cough and shortness of breath.    Cardiovascular: Negative for chest pain and palpitations.        Positive for bleeding from AV fistula - left upper arm.    Gastrointestinal: Negative for abdominal pain, diarrhea, nausea and vomiting.   Genitourinary: Negative for dysuria.   Musculoskeletal: Negative for back pain.   Skin: Negative for rash.   Neurological: Negative for dizziness, weakness and headaches.   Hematological: Does not bruise/bleed easily.       Physical Exam   Initial Vitals   BP Pulse Resp Temp SpO2   05/08/17 1706 05/08/17 1706 05/08/17 1706 05/08/17 1706 05/08/17 1706   215/97 75 24 97.6 °F (36.4 °C) 92 %     Physical Exam    Nursing note and vitals reviewed.  Constitutional: He appears well-developed and well-nourished. He is cooperative. He does not appear ill. No distress.   HENT:   Head: Normocephalic and atraumatic.   Nose: Nose normal.   Mouth/Throat: Uvula is midline and oropharynx is clear and moist.   Eyes: Conjunctivae, EOM and lids are normal. Pupils are equal, round, and reactive to light.   Neck: Trachea normal and normal range of motion. Neck supple.   Cardiovascular: Normal rate, regular rhythm, intact distal pulses and normal pulses.   Patient has an arteriovenous anastomosis shunt located to left upper arm (brachial-basilic).  Positive bruit and thrill noted to fistula.  Bleeding noted proximally of AV fistula anastomosis.    Pulmonary/Chest: Effort normal and breath sounds normal. No respiratory distress. He has no wheezes. He has no rhonchi. He has no rales.   Abdominal: Soft. He exhibits no distension and no mass.  There is no tenderness. There is no rebound and no guarding.   Musculoskeletal: Normal range of motion. He exhibits no edema.   Neurological: He is alert and oriented to person, place, and time. He has normal strength. No cranial nerve deficit or sensory deficit. GCS eye subscore is 4. GCS verbal subscore is 5. GCS motor subscore is 6.   Neurovascular intact to all extremities.    Skin: Skin is warm, dry and intact. No rash noted.   Color appear jaundiced.    Psychiatric: He has a normal mood and affect. His speech is normal and behavior is normal. Judgment and thought content normal. Cognition and memory are normal.         ED Course   Lac Repair  Date/Time: 5/8/2017 6:36 PM  Performed by: FRANCISCO WRIGHT.  Authorized by: FRANCISCO WRIGHT.   Consent Done: Not Needed  Body area: upper extremity  Location details: left upper arm  Anesthesia: local infiltration    Anesthesia:  Anesthesia: local infiltration  Local Anesthetic: lidocaine 1% with epinephrine   Patient sedated: no  Preparation: Patient was prepped and draped in the usual sterile fashion.  Skin closure: Ethilon  Number of sutures: 2  Patient tolerance: Patient tolerated the procedure well with no immediate complications  Comments: 1 figure 8   1 cross stitch  Post procedure - bleeding controled, AV shunt with good thrill.           ED Abnormal Lab Results:   Labs Reviewed   CBC W/ AUTO DIFFERENTIAL - Abnormal; Notable for the following:        Result Value    RBC 3.35 (*)     Hemoglobin 9.3 (*)     Hematocrit 30.0 (*)     MCHC 31.0 (*)     RDW 18.0 (*)     MPV 9.0 (*)     Gran # 8.3 (*)     Gran% 81.0 (*)     Lymph% 10.0 (*)     All other components within normal limits   COMPREHENSIVE METABOLIC PANEL - Abnormal; Notable for the following:     Creatinine 3.4 (*)     Albumin 3.4 (*)     Alkaline Phosphatase 185 (*)     eGFR if  19.8 (*)     eGFR if non  17.2 (*)     All other components within normal limits   APTT   PROTIME-INR          ED Lab Results:   Results for orders placed or performed during the hospital encounter of 05/08/17   CBC auto differential   Result Value Ref Range    WBC 10.23 3.90 - 12.70 K/uL    RBC 3.35 (L) 4.60 - 6.20 M/uL    Hemoglobin 9.3 (L) 14.0 - 18.0 g/dL    Hematocrit 30.0 (L) 40.0 - 54.0 %    MCV 90 82 - 98 fL    MCH 27.8 27.0 - 31.0 pg    MCHC 31.0 (L) 32.0 - 36.0 %    RDW 18.0 (H) 11.5 - 14.5 %    Platelets 263 150 - 350 K/uL    MPV 9.0 (L) 9.2 - 12.9 fL    Gran # 8.3 (H) 1.8 - 7.7 K/uL    Lymph # 1.0 1.0 - 4.8 K/uL    Mono # 0.6 0.3 - 1.0 K/uL    Eos # 0.2 0.0 - 0.5 K/uL    Baso # 0.06 0.00 - 0.20 K/uL    Gran% 81.0 (H) 38.0 - 73.0 %    Lymph% 10.0 (L) 18.0 - 48.0 %    Mono% 5.6 4.0 - 15.0 %    Eosinophil% 2.1 0.0 - 8.0 %    Basophil% 0.6 0.0 - 1.9 %    Differential Method Automated    Comprehensive metabolic panel   Result Value Ref Range    Sodium 139 136 - 145 mmol/L    Potassium 3.8 3.5 - 5.1 mmol/L    Chloride 100 95 - 110 mmol/L    CO2 25 23 - 29 mmol/L    Glucose 110 70 - 110 mg/dL    BUN, Bld 16 8 - 23 mg/dL    Creatinine 3.4 (H) 0.5 - 1.4 mg/dL    Calcium 9.3 8.7 - 10.5 mg/dL    Total Protein 7.1 6.0 - 8.4 g/dL    Albumin 3.4 (L) 3.5 - 5.2 g/dL    Total Bilirubin 0.8 0.1 - 1.0 mg/dL    Alkaline Phosphatase 185 (H) 55 - 135 U/L    AST 16 10 - 40 U/L    ALT 18 10 - 44 U/L    Anion Gap 14 8 - 16 mmol/L    eGFR if African American 19.8 (A) >60 mL/min/1.73 m^2    eGFR if non  17.2 (A) >60 mL/min/1.73 m^2   APTT   Result Value Ref Range    aPTT 30.7 21.0 - 32.0 sec   Protime-INR   Result Value Ref Range    Prothrombin Time 11.4 9.0 - 12.5 sec    INR 1.1 0.8 - 1.2       ED Medications:   Medications   lidocaine-EPINEPHrine (PF) 1%-1:200,000 injection 1 mL (1 mL Other Given 5/8/17 1822)   neomycin-bacitracnZn-polymyxnB packet 1 each (1 each Topical (Top) Given 5/8/17 1848)           ED Course Vitals  Vitals:    05/08/17 1706 05/08/17 1715 05/08/17 1836 05/08/17 1842   BP: (!) 215/97  (!)  177/70 (!) 147/65   Pulse: 75 68 66 65   Resp: (!) 24  18 19   Temp: 97.6 °F (36.4 °C)      SpO2: (!) 92% 100% 100% 100%   Weight: 79.4 kg (175 lb)          1845 HOURS RE-EVALUATION & DISPOSITION:   Reassessment at the time of disposition demonstrates that the patient is resting comfortably in no acute distress.  He has remained hemodynamically stable throughout the entire ED visit and is without objective evidence for acute process requiring urgent intervention or hospitalization. I discussed test results and provided counseling to patient with regard to condition, the treatment plan, specific conditions for return, and the importance of follow up.  Answered questions at this time. The patient is stable for discharge.               Medical Decision Making:   Clinical Tests:   Lab Tests: Ordered and Reviewed  Other:   I have discussed this case with another health care provider.       <> Summary of the Discussion: Attending Physicians:   Casimiro Villafuerte MD & Wayne Bailey MD              Attending Attestation:   Physician Attestation Statement for Resident:  As the supervising MD   Physician Attestation Statement: I have personally seen and examined this patient.   I agree with the above history. -:   As the supervising MD I agree with the above PE.    As the supervising MD I agree with the above treatment, course, plan, and disposition.   -: I performed the procedure.  I have reviewed and agree with the residents interpretation of the following: lab data.                      Clinical Impression:       ICD-10-CM ICD-9-CM   1. Bleeding from dialysis shunt, initial encounter T82.838A 996.73     459.0   2. AV shunt malfunction, initial encounter T82.591A 996.1         Disposition:   Disposition: Discharged  Condition: Stable  I discussed with patient that the evaluation in the emergency department does not suggest any emergent or life threatening medical condition requiring immediate intervention beyond what was  provided in the ED, and I believe patient is safe for discharge.  Regardless, an unremarkable evaluation in the ED does not preclude the development or presence of a serious of life threatening condition. As such, patient was instructed to return immediately for any worsening or change in current symptoms. I also discussed the results of my evaluation and diagnostics with patient and he concurs with the evaluation and management plan.  Detailed written and verbal instructions provided to patient and he expressed a verbal understanding of the discharge instructions and overall management plan. Reiterated the importance of medication administration and safety and advised patient to follow up with primary care provider in 3-5 days or sooner if needed.  Also advised patient to return to the ER for any complications.         Follow-up Information     Schedule an appointment as soon as possible for a visit with Rufus Nava MD.    Specialty:  General Surgery    Why:  FOLLOW UP TOMORROW!    Contact information:    [address]  [address]  VASCULAR CLINIC  [facility] [address]  [phone]               Steve Rodriguez NP  05/08/17 2155       Wayne Bailey MD  05/09/17 7587

## 2017-05-08 NOTE — ED AVS SNAPSHOT
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 Rufus Leon MD, TOMORROW for evaluation of your dialysis shunt.            To Do List           Follow-up Information     Schedule an appointment as soon as possible for a visit with Rufus Nava MD.    Specialty:  General Surgery    Why:  FOLLOW UP TOMORROW!    Contact information:    [address]  [address]  VASCULAR CLINIC  [facility] [id number] [phone]        [facility]     [facility] Care Line -  Assistance  Unless otherwise directed by your provider, please contact [facility], our nurse care line that is available for  assistance.     Registered nurses in the [facility] provide: appointment scheduling, clinical advisement, health education, and other advisory services.  Call: [phone] (toll free)               Medications           Message regarding Medications     Verify the changes and/or additions to your medication regime listed below are the same as discussed with your clinician today.  If any of these changes or additions are incorrect, please notify your healthcare provider.        These medications were administered today        Dose Freq    lidocaine-EPINEPHrine (PF)  1%-1:200,000 injection 1 mL 1 mL ED 1 Time    Si mL by Other route ED 1 Time.    Class: Normal    Route: Other      STOP taking these medications     BIDIL 20-37.5 mg Tab            Verify that the below list of medications is an accurate representation of the medications you are currently taking.  If none reported, the list may be blank. If incorrect, please contact your healthcare provider. Carry this list with you in case of emergency.           Current Medications     BYSTOLIC 10 mg Tab Take 10 mg by mouth 2 (two) times daily.     diphenoxylate-atropine 2.5-0.025 mg (LOMOTIL) 2.5-0.025 mg per tablet TAKE 1 TABLET BY MOUTH EVERY 8 HOURS AS NEEDED FOR LOOSE STOOL    hydrALAZINE (APRESOLINE) 50 MG tablet Take 50 mg by mouth 3 (three) times daily.     isosorbide dinitrate (ISORDIL) 20 MG tablet Take 20 mg by mouth 3 (three) times daily.     lidocaine-prilocaine (EMLA) cream APPLY SMALL AMOUNT TO ACCESS SITE 1 HOUR BEFORE DIALYSIS ; COVER WITH AN OCCLUSIVE DRESSING    lisinopril (PRINIVIL,ZESTRIL) 20 MG tablet Take 20 mg by mouth once daily.     venlafaxine (EFFEXOR-XR) 75 MG 24 hr capsule Take 75 mg by mouth once daily.     lidocaine-EPINEPHrine (PF) 1%-1:200,000 injection 1 mL 1 mL by Other route ED 1 Time.           Clinical Reference Information           Your Vitals Were     BP Pulse Temp Resp Weight SpO2    215/97 68 97.6 °F (36.4 °C) 24 79.4 kg (175 lb) 100%    BMI                23.73 kg/m2          Allergies as of 2017     No Known Allergies      Immunizations Administered on Date of Encounter - 2017     None      ED Micro, Lab, POCT     Start Ordered       Status Ordering Provider    17 1657 17 1656  APTT  STAT      Final result     17 1657 17 1656  Protime-INR  STAT      Final result     17 1656 17 1656  CBC auto differential  STAT      Final result     17 1656 17 1656  Comprehensive metabolic panel  STAT      Final result       ED Imaging Orders      None      Discharge References/Attachments     HEMODIALYSIS ACCESS BLEEDING (ENGLISH)    HEMODIALYSIS ACCESS, CARING FOR YOUR (ENGLISH)      [facility] Sign-Up     Activating your [facility] account is as easy as 1-2-3!     1) Visit [email or web address], select Sign Up Now, enter this activation code and your date of birth, then select Next.  [email or web address]  Expires: 6/22/2017  6:37 PM      2) Create a username and password to use when you visit [facility] in the future and select a security question in case you lose your password and select Next.    3) Enter your e-mail address and click Sign Up!    Additional Information  If you have questions, please e-mail [email or web address] or call [phone] to talk to our [facility] staff. Remember, [facility] is NOT to be used for urgent needs. For medical emergencies, dial 911.         Smoking Cessation     If you would like to quit smoking:   You may be eligible for free services if you are a [place] resident and started smoking cigarettes before September 1, 1988.  Call the Smoking Cessation Trust (SCT) toll free at (783) 741-4860 or (820) 570-9435.   Call 3-800-QUIT-NOW if you do not meet the above criteria.   Contact us via email: [email or web address]   View our website for more information: [email or web address]         [facility] complies with applicable Federal civil rights laws and does not discriminate on the basis of race, color, national origin, age, disability, or sex.        Language Assistance Services     ATTENTION: Language assistance services are available, free of charge. Please call [phone].      ATENCIÓN: Si habla español, tiene a ramirez disposición servicios gratuitos de asistencia lingüística. Llame al [phone].     CHÚ Ý: N?u b?n nói Ti?ng Vi?t, có các d?ch v? h? tr? ngôn ng? mi?n phí dành cho b?n. G?i s? [phone].

## 2017-05-08 NOTE — ED NOTES
Bed: 09  Expected date:   Expected time:   Means of arrival:   Comments:  patti Rodriguez NP  05/08/17 0841